# Patient Record
Sex: MALE | Race: WHITE | NOT HISPANIC OR LATINO | URBAN - METROPOLITAN AREA
[De-identification: names, ages, dates, MRNs, and addresses within clinical notes are randomized per-mention and may not be internally consistent; named-entity substitution may affect disease eponyms.]

---

## 2019-09-11 ENCOUNTER — OFFICE VISIT CONVERTED (OUTPATIENT)
Dept: UROLOGY | Facility: CLINIC | Age: 44
End: 2019-09-11
Attending: UROLOGY

## 2020-09-08 ENCOUNTER — HOSPITAL ENCOUNTER (OUTPATIENT)
Dept: CT IMAGING | Facility: HOSPITAL | Age: 45
Discharge: HOME | End: 2020-09-08
Attending: UROLOGY

## 2020-09-08 LAB
CREAT BLD-MCNC: 1 MG/DL (ref 0.6–1.4)
GFR SERPLBLD BASED ON 1.73 SQ M-ARVRAT: >60 ML/MIN/{1.73_M2}

## 2020-09-17 ENCOUNTER — TELEPHONE CONVERTED (OUTPATIENT)
Dept: UROLOGY | Facility: CLINIC | Age: 45
End: 2020-09-17
Attending: UROLOGY

## 2021-05-13 NOTE — PROGRESS NOTES
"   Progress Note      Patient Name: Trung Blevins   Patient ID: [id number]   Sex: Male   YOB: 1975    Primary Care Provider: Louise Martin MD   Referring Provider: Louise Martin MD    Visit Date: September 17, 2020    Provider: Shwetha Upton MD   Location: [facility]   Location Address: [address] [place] [address]  [id number]   Location Phone: [phone]          Chief Complaint  · Pt is here for urological concerns     Telephone Visit- presents for evaluation via telephone.   Verbal consent obtained before beginning visit.   The following staff were present during this visit: Mitali Rodriguez LPN  Total time for encounter: 11 minutes       History Of Present Illness     43 yo male referred to urology for further evaluation of incidental finding on CT scan of a bilateral renal lesions during admission to [facility] for acute pancreatitis. Patient initially presented to hospital with complaint of weakness, malaise, myalgias, headache, and nausea that was worsening over the past 2 months. Workup revealed acute pancreatitis,sepsis for which he was treated.     CT abd/pelvis with IV contrast performed, 8/29/19, for evaluation of epigastric pain indicated \" There are 2 calcifications in the right kidney measuring up to 3mm. There is a 17mm exophytic isodense right renal lesion and a 19mm exophytic isodense left renal lesion. These are not simple cysts.\"    Patient denies prior urologic history. Endorses nocturia of 3-4x a night; no other voiding symptoms.   Denies hematuria.   Denies recent weight loss; noting weight gain from fluid retention. Denies flank pain. No family history of  malignancy.     Greatest complaint today is difficulty ambulating persistent myalgias for which he requests narcotic medication. Notes he has not established care with a PCP but was instructed to do so at discharge.     Father who presents with patient has Goodpasture syndrome; patient denies " history of renal disease or insufficiency.    Update 9/17/2020: Patient presents for follow-up with CT scan prior. Denies flank pain and hematuria. Since last visit has had MRIs and muscle biopsies due to chronic leg pain.       Past Medical History  Allergic rhinitis, chronic; High blood pressure; Renal mass         Past Surgical History  *Metal Implant; Muscle biopsy; Tonsilectomy; Tooth extraction, surgical         Medication List  Benadryl 25 mg oral capsule; lisinopril 20 mg oral tablet; tizanidine 4 mg oral tablet         Allergy List  SULFA (SULFONAMIDES)       Allergies Reconciled  Family Medical History  Diabetes, unspecified type; Renal Calculus         Social History  Alcohol (Former); Caffeine (Current some day); Second hand smoke exposure (Current some day); Tobacco (Current every day)         Review of Systems  · Constitutional  o Denies  o : chills, fever  · Gastrointestinal  o Denies  o : nausea, vomiting, diarrhea          Results     60ml/min/1.73m2     FINDINGS:   Within the lung bases is minimal right basilar atelectasis.     There is a 1.9 cm hyperdense cyst along the inferior right kidney compatible with a proteinaceous   or hemorrhagic cyst.  There is a 2.5 cm simple cyst along the lower left kidney.  There is a 1.0 cm   simple cyst along the superior left kidney.  No solid renal mass is identified.     There is hepatic steatosis.  The gallbladder, adrenal glands, spleen, and pancreas are   unremarkable.     The stomach appears normal.  The visualized small and large bowel is unremarkable.  There is no   ascites or loculated collection.       No aggressive osseous lesions are identified.     CONCLUSION:   1. Benign bilateral renal cysts as described above.  No concerning solid renal mass identified.  2. No acute process identified within abdomen.  3. Hepatic steatosis.            CARLOS ALBERTO WANG MD         Electronically Signed and Approved By: CARLOS ALBERTO WANG MD on 9/08/2020 at 13:03         "             Until signed, this is an unconfirmed preliminary report that may contain  errors and is subject to change.                RODD1:  D:20 1303  \">[facility]      PACS RADIOLOGY REPORT    Patient: MONA BARRON Acct: #[id number] Report: #[id number]    UNIT #: [id number]  DOS: 20 1205  INSURANCE:[employer] ORDER #:[id number]  LOCATION:CT   : 1975    PROVIDERS  ADMITTING:   ATTENDING: JYOTHI ROBERTO  FAMILY:  TIFFANIE PERRY ORDERING:  JYOTHI ROBERTO   OTHER:  DICTATING:  Gian Holman MD    REQ #:[id number]   EXAM:ABDB - CT ABDOMEN w wo CONTRAST  REASON FOR EXAM:  RENAL MASS  REASON FOR VISIT:  RENAL MASS    *******Signed******     PROCEDURE: CT ABDOMEN WITH AND WITHOUT CONTRAST     COMPARISON: None.     INDICATIONS: BILATERAL RENAL MASS     TECHNIQUE: After obtaining the patient's consent, CT images were created without and with non-ionic   intravenous contrast material.       PROTOCOL:   Renal mass imaging protocol performed      RADIATION:   DLP: 1495mGy*cm    Automated exposure control was utilized to minimize radiation dose.   CONTRAST: 100cc Isovue 370 I.V.  LABS:   eGFR: >60ml/min/1.73m2     FINDINGS:   Within the lung bases is minimal right basilar atelectasis.     There is a 1.9 cm hyperdense cyst along the inferior right kidney compatible with a proteinaceous   or hemorrhagic cyst.  There is a 2.5 cm simple cyst along the lower left kidney.  There is a 1.0 cm   simple cyst along the superior left kidney.  No solid renal mass is identified.     There is hepatic steatosis.  The gallbladder, adrenal glands, spleen, and pancreas are   unremarkable.     The stomach appears normal.  The visualized small and large bowel is unremarkable.  There is no   ascites or loculated collection.       No aggressive osseous lesions are identified.     CONCLUSION:   1. Benign bilateral renal cysts as described above.  No concerning " solid renal mass identified.  2. No acute process identified within abdomen.  3. Hepatic steatosis.            CARLOS ALBERTO WANG MD         Electronically Signed and Approved By: CARLOS ALBERTO WANG MD on 9/08/2020 at 13:03                     Until signed, this is an unconfirmed preliminary report that may contain  errors and is subject to change.                RODD1:  D:09/08/20 1303         Assessment  · Renal cyst     753.10/N28.1    Problems Reconciled  Plan  · Orders  o Physician Telephone Evaluation, 11-20 minutes ([id number]) - 753.10/N28.1 - 09/17/2020  · Medications  o Medications have been Reconciled  o Transition of Care or Provider Policy  · Instructions  o Electronically Identified Patient Education Materials Provided Electronically     CT scan imaging reviewed, discussed with patient.  Patient with bilateral renal cyst.  No concerning renal mass.  Discussed with patient that benign renal cysts, have no malignant potential, may change in size over time, but do not warrant routine surveillance or management. They are generally considered sporadic and mostly clinically inconsequential.   Patient encouraged to follow-up as needed   all questions addressed                Electronically Signed by: Shwetha Upton MD -Author on September 17, 2020 03:24:01 PM

## 2021-05-15 VITALS — RESPIRATION RATE: 14 BRPM | BODY MASS INDEX: 25.6 KG/M2 | WEIGHT: 189 LBS | HEIGHT: 72 IN
